# Patient Record
Sex: MALE | Race: WHITE
[De-identification: names, ages, dates, MRNs, and addresses within clinical notes are randomized per-mention and may not be internally consistent; named-entity substitution may affect disease eponyms.]

---

## 2017-03-16 ENCOUNTER — HOSPITAL ENCOUNTER (INPATIENT)
Dept: HOSPITAL 61 - ER | Age: 37
LOS: 1 days | Discharge: LEFT BEFORE DISCHARGE | DRG: 894 | End: 2017-03-17
Attending: INTERNAL MEDICINE | Admitting: INTERNAL MEDICINE
Payer: SELF-PAY

## 2017-03-16 VITALS — BODY MASS INDEX: 29.13 KG/M2 | HEIGHT: 74 IN | WEIGHT: 227 LBS

## 2017-03-16 DIAGNOSIS — F90.9: ICD-10-CM

## 2017-03-16 DIAGNOSIS — I10: ICD-10-CM

## 2017-03-16 DIAGNOSIS — F15.10: ICD-10-CM

## 2017-03-16 DIAGNOSIS — Z88.0: ICD-10-CM

## 2017-03-16 DIAGNOSIS — E87.2: ICD-10-CM

## 2017-03-16 DIAGNOSIS — K58.9: ICD-10-CM

## 2017-03-16 DIAGNOSIS — F10.239: Primary | ICD-10-CM

## 2017-03-16 DIAGNOSIS — F32.9: ICD-10-CM

## 2017-03-16 DIAGNOSIS — F12.10: ICD-10-CM

## 2017-03-16 DIAGNOSIS — F17.200: ICD-10-CM

## 2017-03-16 DIAGNOSIS — R56.9: ICD-10-CM

## 2017-03-16 DIAGNOSIS — Z82.49: ICD-10-CM

## 2017-03-16 DIAGNOSIS — Z90.49: ICD-10-CM

## 2017-03-16 DIAGNOSIS — F41.9: ICD-10-CM

## 2017-03-16 LAB
ALBUMIN SERPL-MCNC: 3.9 G/DL (ref 3.4–5)
ALP SERPL-CCNC: 136 U/L (ref 46–116)
ALT SERPL-CCNC: 185 U/L (ref 16–63)
ANION GAP SERPL CALC-SCNC: 24 MMOL/L (ref 6–14)
AST SERPL-CCNC: 183 U/L (ref 15–37)
BACTERIA #/AREA URNS HPF: 0 /HPF
BARBITURATES UR-MCNC: (no result) UG/ML
BASOPHILS # BLD AUTO: 0 X10^3/UL (ref 0–0.2)
BASOPHILS NFR BLD: 1 % (ref 0–3)
BENZODIAZ UR-MCNC: (no result) UG/L
BILIRUB DIRECT SERPL-MCNC: 0.3 MG/DL (ref 0–0.2)
BILIRUB SERPL-MCNC: 1.3 MG/DL (ref 0.2–1)
BILIRUB UR QL STRIP: (no result)
BUN SERPL-MCNC: 9 MG/DL (ref 8–26)
CALCIUM SERPL-MCNC: 9.7 MG/DL (ref 8.5–10.1)
CANNABINOIDS UR-MCNC: (no result) UG/L
CHLORIDE SERPL-SCNC: 101 MMOL/L (ref 98–107)
CO2 SERPL-SCNC: 16 MMOL/L (ref 21–32)
COCAINE UR-MCNC: (no result) NG/ML
CREAT SERPL-MCNC: 1.2 MG/DL (ref 0.7–1.3)
EOSINOPHIL NFR BLD: 1 % (ref 0–3)
ERYTHROCYTE [DISTWIDTH] IN BLOOD BY AUTOMATED COUNT: 15.4 % (ref 11.5–14.5)
ETHANOL, URINE: (no result)
GFR SERPLBLD BASED ON 1.73 SQ M-ARVRAT: 69.3 ML/MIN
GLUCOSE SERPL-MCNC: 123 MG/DL (ref 70–99)
GLUCOSE UR STRIP-MCNC: NEGATIVE MG/DL
HCT VFR BLD CALC: 47 % (ref 39–53)
HGB BLD-MCNC: 16.2 G/DL (ref 13–17.5)
LYMPHOCYTES # BLD: 2.5 X10^3/UL (ref 1–4.8)
LYMPHOCYTES NFR BLD AUTO: 38 % (ref 24–48)
MAGNESIUM SERPL-MCNC: 2 MG/DL (ref 1.8–2.4)
MCH RBC QN AUTO: 34 PG (ref 25–35)
MCHC RBC AUTO-ENTMCNC: 34 G/DL (ref 31–37)
MCV RBC AUTO: 100 FL (ref 79–100)
METHADONE SERPL-MCNC: (no result) NG/ML
MONOCYTES NFR BLD: 8 % (ref 0–9)
NEUTROPHILS NFR BLD AUTO: 53 % (ref 31–73)
NITRITE UR QL STRIP: NEGATIVE
OPIATES UR-MCNC: (no result) NG/ML
PCP SERPL-MCNC: (no result) MG/DL
PH UR STRIP: 7 [PH]
PLATELET # BLD AUTO: 91 X10^3/UL (ref 140–400)
POTASSIUM SERPL-SCNC: 3.4 MMOL/L (ref 3.5–5.1)
PROT SERPL-MCNC: 8.1 G/DL (ref 6.4–8.2)
PROT UR STRIP-MCNC: 100 MG/DL
RBC # BLD AUTO: 4.71 X10^6/UL (ref 4.3–5.7)
RBC #/AREA URNS HPF: (no result) /HPF (ref 0–2)
SODIUM SERPL-SCNC: 141 MMOL/L (ref 136–145)
SP GR UR STRIP: 1.02
SQUAMOUS #/AREA URNS LPF: (no result) /LPF
UROBILINOGEN UR-MCNC: 4 MG/DL
WBC # BLD AUTO: 6.7 X10^3/UL (ref 4–11)
WBC #/AREA URNS HPF: (no result) /HPF (ref 0–4)

## 2017-03-16 PROCEDURE — 71010: CPT

## 2017-03-16 PROCEDURE — 83605 ASSAY OF LACTIC ACID: CPT

## 2017-03-16 PROCEDURE — 80048 BASIC METABOLIC PNL TOTAL CA: CPT

## 2017-03-16 PROCEDURE — 36415 COLL VENOUS BLD VENIPUNCTURE: CPT

## 2017-03-16 PROCEDURE — 80076 HEPATIC FUNCTION PANEL: CPT

## 2017-03-16 PROCEDURE — 87086 URINE CULTURE/COLONY COUNT: CPT

## 2017-03-16 PROCEDURE — 83735 ASSAY OF MAGNESIUM: CPT

## 2017-03-16 PROCEDURE — 85027 COMPLETE CBC AUTOMATED: CPT

## 2017-03-16 PROCEDURE — 81001 URINALYSIS AUTO W/SCOPE: CPT

## 2017-03-16 PROCEDURE — 70551 MRI BRAIN STEM W/O DYE: CPT

## 2017-03-16 PROCEDURE — 93005 ELECTROCARDIOGRAM TRACING: CPT

## 2017-03-16 NOTE — HP
ADMIT DATE:  03/16/2017



CHIEF COMPLAINT:  Seizure, alcohol withdrawal.



HISTORY OF PRESENT ILLNESS:  The patient is a pleasant 36-year-old male who

states he drinks a gallon of Everclear every couple of days.  He has been trying

to come off of it and apparently did it too fast, now he has had seizures.  I

discussed the case with the ER physician.  We are to admit the patient with

alcohol withdrawal protocol.



PAST MEDICAL HISTORY:  Alcoholism, ADHD, anxiety, previous seizures,

cholecystectomy, steel plate in the right hand, methamphetamine abuse, marijuana

abuse.



ALLERGIES:  PENICILLIN.



FAMILY HISTORY:  Hypertension.



SOCIAL HISTORY:  He is .  He drinks heavily.  He smokes heavily.  He does

drugs.



MEDICATIONS:  Reviewed, please refer to the MRAD.



REVIEW OF SYSTEMS:

GENERAL:  No history of weight change, weakness or fevers.

SKIN:  No bruising, hair changes or rashes.

EYES:  No blurred, double or loss of vision.

NOSE AND THROAT:  No history of nosebleeds, hoarseness or sore throat.

HEART:  No history of palpitations, chest pain or shortness of breath on

exertion.

LUNGS:  Denies cough, hemoptysis, wheezing or shortness of breath.

GASTROINTESTINAL:  Denies changes in appetite, nausea, vomiting, diarrhea or

constipation.

GENITOURINARY:  No history of frequency, urgency, hesitancy or nocturia.

NEUROLOGIC:  Complains of alcohol withdrawal symptoms and seizures.

PSYCHIATRIC:  No history of panic, anxiety or depression.

ENDOCRINE:  No history of heat or cold intolerance, polyuria or polydipsia.

EXTREMITIES:  Denies muscle weakness, joint pain, pain on walking or stiffness.



PHYSICAL EXAMINATION:

VITAL SIGNS:  Temperature afebrile, pulse 62, respirations 20, blood pressure

121/90.

GENERAL:  He is alert, cooperative in the ER, anxious.

HEART:  Normal S1, S2.

LUNGS:  Clear.

ABDOMEN:  Soft, positive bowel sounds, a little tender.

EXTREMITIES:  Trace edema.

SKIN:  No rashes.

PSYCHIATRIC:  He is anxious.

VASCULAR:  Good capillary refill.

ENDOCRINE:  No thyromegaly.

LYMPHATICS:  No cervical nodes.

HEMATOPOIETIC:  No bruising.



LABORATORY DATA:  Hematology normal other than a platelet count of 91. 

Electrolytes:  Sodium 141, potassium 3.4, chloride 101, bicarbonate 16, BUN 9,

creatinine _____, glucose 123.  Lactic acid 12.2, we repeated it and now stands

at 1.6.



ASSESSMENT AND PLAN:  Alcohol withdrawal seizures with incidental finding of

lactic acidosis, which resolved, suspect that was from his seizures.  We are

going to admit the patient, consult Neurology for a second opinion.  Alcohol

withdrawal protocol.  Replace his potassium.   consult.

 



______________________________

ALEXISL JERRY HERNANDEZ DO



DR:  [email or web address]  JOB#:  [id number] / [id number]

DD:  03/16/2017 23:15  DT:  03/16/2017 23:28

## 2017-03-16 NOTE — PHYS DOC
Past Medical History


Past Medical History:  Alcoholism, Anxiety, Seizure, Other


Additional Past Medical Histor:  ADHD, ETOH LIVER,seizures r/t etoh withdrawl


Past Surgical History:  Cholecystectomy


Additional Past Surgical Histo:  steel plate in right hand


Alcohol Use:  Heavy


Drug Use:  Marijuana, Methamphetamine





Adult General


Chief Complaint


Chief Complaint:  WITHDRAWL





HPI


HPI


Patient is a 36  year old male who presents after apparent alcohol withdrawal 

seizure. Patient's girlfriend present at bedside who contributes to history. 

She reports he tensed up, had generalized shaking and clenched fists, and bit 

his tongue. Upon coming to he was confused for a period of time. Patient 

reports he has tried to abruptly to cut down on his alcohol intake. Last week 

he drank about a gallon of Everclear over the course of 2-3 days. Today he has 

only had about 1-1/2 beers. He does report a history of alcohol withdrawal 

seizure in past.





Review of Systems


Review of Systems


Constitutional: Denies fever or chills 


Eyes: Denies change in visual acuity or eye pain 


HENT: Denies nasal congestion or sore throat 


Respiratory: Denies cough or shortness of breath 


Cardiovascular: Denies chest pain


GI: Denies abdominal pain, nausea, vomiting, bloody stools or diarrhea 


: Denies dysuria or hematuria 


Musculoskeletal: General body aches


Integument: Denies rash or skin lesions 


Neurologic: Seizure, mild headache. Denies focal weakness or sensory changes





Current Medications


Current Medications





 Current Medications








 Medications


  (Trade)  Dose


 Ordered  Sig/Jamie  Start Time


 Stop Time Status Last Admin


Dose Admin


 


 Diazepam


  (Valium)  10 mg  1X  ONCE  3/16/17 18:15


 3/16/17 18:16 DC 3/16/17 18:53


10 MG


 


 Multivitamins/


 Folic Acid/


 Thiamine HCl/


 Sodium Chloride


  (Infuvite Adult/


 Iv Sodium


 Chloride 0.9%


 1000ml Bag)  1,011.2 ml


  @ 1,000 mls/


 hr  1X  ONCE  3/16/17 18:00


 3/16/17 19:00 DC 3/16/17 18:12


1,000 MLS/HR











Allergies


Allergies





 Allergies








Coded Allergies Type Severity Reaction Last Updated Verified


 


  Penicillins Allergy Intermediate Rash 12/9/13 Yes











Physical Exam


Physical Exam


Constitutional: Well developed, well nourished, non-toxic appearance


HENT: Normocephalic, bilateral external ears normal; mild abrasion to L lateral 

tongue, tongue minimally swollen


Eyes: PERRL, EOMI, conjunctiva normal, no discharge


Neck: Normal range of motion, no stridor


Cardiovascular: Tachycardic, regular rhythm, no murmur 


Lungs & Thorax:  Bilateral breath sounds clear to auscultation 


Abdomen: Bowel sounds normal, soft, non-distended, no TTP


Skin: Warm, diaphoretic, no erythema, no rash


Extremities: No obvious deformity, no edema


Neurologic: Alert and oriented X 3, GCS 15, CN II-XII grossly intact, strength 

intact and symmetrical throughout, sensation to light touch intact throughout, 

no dystaxia noted


Psychologic: Affect normal, judgement normal, mood normal





Current Patient Data


Vital Signs





 Vital Signs








  Date Time  Temp Pulse Resp B/P Pulse Ox O2 Delivery O2 Flow Rate FiO2


 


3/16/17 19:07  116 20 141/95 97 Room Air  


 


3/16/17 17:33 98.0       





 98.0       








Lab Values





 Laboratory Tests








Test


  3/16/17


17:30 3/16/17


17:37


 


White Blood Count


  6.7x10^3/uL


(4.0-11.0) 


 


 


Red Blood Count


  4.71x10^6/uL


(4.30-5.70) 


 


 


Hemoglobin


  16.2g/dL


(13.0-17.5) 


 


 


Hematocrit


  47.0%


(39.0-53.0) 


 


 


Mean Corpuscular Volume


  100fL ()


  


 


 


Mean Corpuscular Hemoglobin 34pg (25-35)   


 


Mean Corpuscular Hemoglobin


Concent 34g/dL (31-37)


  


 


 


Red Cell Distribution Width


  15.4%


(11.5-14.5)  H 


 


 


Platelet Count


  91x10^3/uL


(140-400)  L 


 


 


Neutrophils (%) (Auto) 53% (31-73)   


 


Lymphocytes (%) (Auto) 38% (24-48)   


 


Monocytes (%) (Auto) 8% (0-9)   


 


Eosinophils (%) (Auto) 1% (0-3)   


 


Basophils (%) (Auto) 1% (0-3)   


 


Neutrophils # (Auto)


  3.5x10^3uL


(1.8-7.7) 


 


 


Lymphocytes # (Auto)


  2.5x10^3/uL


(1.0-4.8) 


 


 


Monocytes # (Auto)


  0.5x10^3/uL


(0.0-1.1) 


 


 


Eosinophils # (Auto)


  0.1x10^3/uL


(0.0-0.7) 


 


 


Basophils # (Auto)


  0.0x10^3/uL


(0.0-0.2) 


 


 


Sodium Level


  141mmol/L


(136-145) 


 


 


Potassium Level


  3.4mmol/L


(3.5-5.1)  L 


 


 


Chloride Level


  101mmol/L


() 


 


 


Carbon Dioxide Level


  16mmol/L


(21-32)  L 


 


 


Anion Gap 24 (6-14)  H 


 


Blood Urea Nitrogen 9mg/dL (8-26)   


 


Creatinine


  1.2mg/dL


(0.7-1.3) 


 


 


Estimated GFR


(Cockcroft-Gault) 69.3  


  


 


 


Glucose Level


  123mg/dL


(70-99)  H 


 


 


Calcium Level


  9.7mg/dL


(8.5-10.1) 


 


 


Magnesium Level


  2.0mg/dL


(1.8-2.4) 


 


 


Total Bilirubin


  1.3mg/dL


(0.2-1.0)  H 


 


 


Direct Bilirubin


  0.3mg/dL


(0.0-0.2)  H 


 


 


Aspartate Amino Transferase


(AST) 183U/L (15-37)


H 


 


 


Alanine Aminotransferase (ALT)


  185U/L (16-63)


H 


 


 


Alkaline Phosphatase


  136U/L


()  H 


 


 


Total Protein


  8.1g/dL


(6.4-8.2) 


 


 


Albumin


  3.9g/dL


(3.4-5.0) 


 


 


Ethyl Alcohol Level


  11mg/dL (0-10)


H 


 


 


Lactic Acid Level


  


  12.2mmol/L


(0.4-2.0)  *H





 Laboratory Tests


3/16/17 17:30








 Laboratory Tests


3/16/17 17:30











EKG


EKG


EKG (my read): sinus rhythm, rate 117, normal axis, TWI leads V5-6, nonspecific 

ST changes





Radiology/Procedures


Radiology/Procedures


CXR (my read): 


No acute abnormality





Course & Med Decision Making


Course & Med Decision Making


Pertinent Labs and Imaging studies reviewed. (See chart for details)


Patient is 36-year-old male who presents after apparent alcohol withdrawal 

seizure. Tachycardic and diaphoretic on exam. Will give IV Valium as needed as 

well as banana bag. Will check EKG, chest x-ray, labs to evaluate. Labs notable 

for lactic acidosis. Will give additional IV fluids, recheck lactic acid. EKG 

and imaging results as above. Discussed results with patient. Given total of 

30mg IV valium while in ED with no further seizure activity; HR improved after 

meds and fluids. Discussed with Dr. Lorenzo, will admit under his care for 

further evaluation and treatment.





Dragon Disclaimer


Dragon Disclaimer


This electronic medical record was generated, in whole or in part, using a 

voice recognition dictation system.





Departure


Departure


Impression:  


 Primary Impression:  


 Alcohol withdrawal seizure


Disposition:  09 ADMITTED AS INPATIENT


Admitting Physician:  Suha Lorenzo


Condition:  GUARDED


Referrals:  


NO PCP (PCP)








MATTHEW MORLEY MD Mar 16, 2017 17:48

## 2017-03-17 VITALS — DIASTOLIC BLOOD PRESSURE: 77 MMHG | SYSTOLIC BLOOD PRESSURE: 146 MMHG

## 2017-03-17 VITALS — DIASTOLIC BLOOD PRESSURE: 88 MMHG | SYSTOLIC BLOOD PRESSURE: 137 MMHG

## 2017-03-17 VITALS — DIASTOLIC BLOOD PRESSURE: 98 MMHG | SYSTOLIC BLOOD PRESSURE: 137 MMHG

## 2017-03-17 LAB
ANION GAP SERPL CALC-SCNC: 11 MMOL/L (ref 6–14)
BASOPHILS # BLD AUTO: 0 X10^3/UL (ref 0–0.2)
BASOPHILS NFR BLD: 1 % (ref 0–3)
BUN SERPL-MCNC: 8 MG/DL (ref 8–26)
CALCIUM SERPL-MCNC: 9 MG/DL (ref 8.5–10.1)
CHLORIDE SERPL-SCNC: 102 MMOL/L (ref 98–107)
CO2 SERPL-SCNC: 25 MMOL/L (ref 21–32)
CREAT SERPL-MCNC: 0.8 MG/DL (ref 0.7–1.3)
EOSINOPHIL NFR BLD: 1 % (ref 0–3)
ERYTHROCYTE [DISTWIDTH] IN BLOOD BY AUTOMATED COUNT: 15.2 % (ref 11.5–14.5)
GFR SERPLBLD BASED ON 1.73 SQ M-ARVRAT: 110.7 ML/MIN
GLUCOSE SERPL-MCNC: 102 MG/DL (ref 70–99)
HCT VFR BLD CALC: 44.7 % (ref 39–53)
HGB BLD-MCNC: 14.9 G/DL (ref 13–17.5)
LYMPHOCYTES # BLD: 1.6 X10^3/UL (ref 1–4.8)
LYMPHOCYTES NFR BLD AUTO: 32 % (ref 24–48)
MCH RBC QN AUTO: 34 PG (ref 25–35)
MCHC RBC AUTO-ENTMCNC: 33 G/DL (ref 31–37)
MCV RBC AUTO: 101 FL (ref 79–100)
MONOCYTES NFR BLD: 9 % (ref 0–9)
NEUTROPHILS NFR BLD AUTO: 57 % (ref 31–73)
PLATELET # BLD AUTO: 66 X10^3/UL (ref 140–400)
POTASSIUM SERPL-SCNC: 3.2 MMOL/L (ref 3.5–5.1)
RBC # BLD AUTO: 4.44 X10^6/UL (ref 4.3–5.7)
SODIUM SERPL-SCNC: 138 MMOL/L (ref 136–145)
WBC # BLD AUTO: 4.8 X10^3/UL (ref 4–11)

## 2017-03-17 NOTE — EKG
[facility]

               [address] [place] [address]

Test Date:    2017               Test Time:    17:46:20

Pat Name:     DAPHNE CÁRDENAS            Department:   

Patient ID:   [id number]           Room:         200 1

Gender:       Male                     Technician:   

:          1980               Requested By: MATTHEW MORLEY

Order Number: [id number]            Reading MD:   Makayla Herbert

                                 Measurements

Intervals                              Axis          

Rate:         117                      P:            6

MI:           138                      QRS:          41

QRSD:         94                       T:            -28

QT:           296                                    

QTc:          417                                    

                           Interpretive Statements

SINUS TACHYCARDIA



T ABNORMALITY IN ANTEROLATERAL LEADS

ABNORMAL ECG

RI6.01

Compared to ECG 2014 22:52:58

No significant changes



Electronically Signed On 3- 20:07:32 CDT by Makayla Herbert

## 2017-03-17 NOTE — RAD
PROCEDURE 

MRI brain without contrast. 

 

HISTORY 

Seizures, history of substance abuse and alcoholism, vision loss 

 

TECHNIQUE 

Multiplanar, multi sequential, non contrast MR imaging was performed of 

the brain. 

 

COMPARISON 

None 

 

FINDINGS 

Exam is degraded by severe motion degradation for almost all image 

sequences, diffusion sequence diagnostic. There is no restricted diffusion

to suggest a recent infarct or cytotoxic edema. There is no midline shift 

or obvious extra-axial fluid collection. Ventricular size is considered 

within normal limits. Accurate evaluation for signal abnormality is very 

limited due to the degree of motion, no large areas of confluent signal 

abnormality identified. There is gross preservation of the major arterial 

intracranial flow voids at the skull base. Mastoids are grossly aerated. 

There is severe left maxillary sinus mucosal thickening. Cerebellar 

tonsils are normal location. There is no obvious abnormality of the pineal

gland or pituitary gland. 

 

IMPRESSION 

1. Exam is very limited due to severe motion. There is no evidence of 

recent infarct or obvious intracranial mass effect.

2. There is severe left maxillary sinus mucosal thickening.

 

 

 

Electronically signed by: Real Black MD (Mar 17, 2017 11:24:50)
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
## 2017-03-17 NOTE — RAD
Portable chest, 3/16/2017:



History: Seizure



Comparison is made to a study from 9/22/2014. The heart size and pulmonary

vascularity are normal. No pulmonary infiltrates are seen. There is no

evidence of pleural fluid.



IMPRESSION: No acute cardiopulmonary abnormality is detected.

## 2017-03-19 NOTE — DS
DATE OF DISCHARGE:  03/17/2017



SHORT STAY AND DISCHARGE SUMMARY



CHIEF COMPLAINT:  Alcohol withdrawal seizures.



HOSPITAL COURSE:  The patient is a 36-year-old gentleman who apparently ingest a

gallon of Everclear every couple of days.  He had been trying to wean himself

off and presented to the Emergency Room with seizures.  He was therefore

admitted for alcohol withdrawal protocol.



The rest of history was obtained from chart as the patient left AMA.



PAST MEDICAL HISTORY:  Alcohol, ADHD, anxiety, history of seizures associated

with alcoholism, status post cholecystectomy, and multidrug abuse.



FAMILY HISTORY:  Hypertension.



SOCIAL HISTORY:  He is , drinking heavily, smoking heavily and using

drugs including cannabis and meths.



ALLERGIES:  PENICILLIN.



MEDICATIONS:  Please refer to MAR.



PHYSICAL EXAMINATION:  Could not be obtained as the patient left before being

seen.



DISCHARGE DATE:  03/17/2017.



DISCHARGE DISPOSITION:  AMA



 

 



______________________________

ASHIA ZAZUETA MD



DR:  UR/nts  JOB#:  [id number] / [id number]

DD:  03/18/2017 17:46  DT:  03/18/2017 22:23

JING

## 2017-03-19 NOTE — ACF
Admission Forms Criteria


ALCOHOL AND PSYCHOACTIVE SUBSTANCE WITHDRAWAL





Clinical Indications for Inpatient Care


                                                                        (Place '

X' for any and all applicable criteria):





Ongoing inpatient care may be indicated for substance withdrawal[B][C] with ANY 

ONE of the following(1)(2)(3)(4)(21):





[ ]I.    Delirium due to alcohol or sedative[D] withdrawal is present.


[ ]II.   Marked signs of withdrawal are present as indicated by ANY ONE of the  

following(15)(22)(23)


        [ ]a)   Heart rate greater than 120 beats per minute is present.


        [ ]b)   Severe vomiting is present (eg, precludes maintenance of oral 

hydration).


        [ ]c)   Grossly visible tremor is present.


        [ ]d)   Profuse perspiration is present.


        [ ]e)   Temperature greater than 101 degrees F (38.3 degrees C) is 

present.


        [ ]f)    Other signs of severe withdrawal are present (eg, Altered 

mental status )


        [ ]g)   Severe withdrawal identified by standardized assessment score[A]


[ ]III.  Signs of withdrawal that require continued inpatient treatment as 

indicated by ANY ONE of the following(15)(22)(23):


        [ ]a)    Inadequate response to pharmacotherapy (eg, benzodiazepines)


        [ ]b)   Outpatient or lower level of care is not feasible or 

appropriate (eg, unavailable or inappropriate to patient condition or treatment 

history).


[X]IV. Withdrawal signs with high-risk indicator are present as manifested by 

ALL of the following[A](15)(22)(23)


        [X]a)  Signs of withdrawal are present as indicated by ANY ONE of the 

following:


                [X]i.     Tachycardia is present.


                [ ]ii.     Nausea or vomiting is present. 


                [ ]iii.     Tremor is present.


                [ ]iv.     Increased perspiration is present.


                [ ]v.     Other signs of withdrawal are present.


                [ ]vi.    Withdrawal identified by standardized assessment score

[A]


         [X]b) Elevated risk due to historical or comorbid factor is present as 

indicated by ANY ONE of the following:


                [ ]i.     History of delirium due to withdrawal is present.


                [X]ii.    History of seizures due to withdrawal is present.[E]


                [ ]iii.   Intrinsic seizure disorder (epilepsy) is present. 


                [ ]iv.    Patient is pregnant.


                [ ]v.    Other significant medical history (eg, severe cardiac 

disease) is present, which is 


                         assessed to be at risk for destabilization due to 

withdrawal.


[ ]V.     Serious electrolyte abnormalities (eg, hyponatremia, hypokalemia, 

hypophosphatemia) requiring 


           correction performable only in inpatient setting(6)(25)


[ ]VI.    Severe hypoglycemia requiring glucose infusions performable only in 

inpatient setting(6)


[ ]VII.   Drug toxicity or instability, such as Altered mental status, 

respiratory depression, or arrhythmias, that requires inpatient care


[ ]VIII.  Danger judged unmanageable at lower level of care because of ANY ONE 

of the following 


           [ ]a)  Danger to self


           [ ]b)  Danger to others


           [ ]c)  Grave disability (eg, inability to perform self-care 

necessary at lower level of care)








The original [facility] content created by [facility] 

[facility] has been revised. 


The portions of the content which have been revised are identified through the 

use of italic text or in bold. [facility] 


has neither reviewed nor approved the modified material. All other unmodified 

content is copyright  [facility].





Please see references footnoted in the original [facility] Care[facility] edition 

2016





Admission Criteria Met?:  Yes








EMILY VELÁZQUEZ Mar 19, 2017 06:25

## 2018-05-07 ENCOUNTER — HOSPITAL ENCOUNTER (EMERGENCY)
Dept: HOSPITAL 61 - ER | Age: 38
LOS: 1 days | Discharge: HOME | End: 2018-05-08
Payer: SELF-PAY

## 2018-05-07 DIAGNOSIS — Z88.0: ICD-10-CM

## 2018-05-07 DIAGNOSIS — F12.10: ICD-10-CM

## 2018-05-07 DIAGNOSIS — M76.62: Primary | ICD-10-CM

## 2018-05-07 DIAGNOSIS — Z90.49: ICD-10-CM

## 2018-05-07 DIAGNOSIS — F15.10: ICD-10-CM

## 2018-05-07 PROCEDURE — 93971 EXTREMITY STUDY: CPT

## 2018-05-07 PROCEDURE — 99284 EMERGENCY DEPT VISIT MOD MDM: CPT

## 2018-05-07 PROCEDURE — 29515 APPLICATION SHORT LEG SPLINT: CPT
